# Patient Record
Sex: FEMALE | Race: WHITE | NOT HISPANIC OR LATINO | URBAN - METROPOLITAN AREA
[De-identification: names, ages, dates, MRNs, and addresses within clinical notes are randomized per-mention and may not be internally consistent; named-entity substitution may affect disease eponyms.]

---

## 2020-07-25 ENCOUNTER — TELEPHONE ENCOUNTER (OUTPATIENT)
Dept: URBAN - METROPOLITAN AREA CLINIC 13 | Facility: CLINIC | Age: 80
End: 2020-07-25

## 2020-07-25 RX ORDER — TRAZODONE HYDROCHLORIDE 50 MG/1
TAKE 1/2 TABLET AT BEDTIME TABLET ORAL
Refills: 0 | OUTPATIENT
End: 2019-12-05

## 2020-07-25 RX ORDER — RABEPRAZOLE SODIUM 20 MG/1
TAKE 1 TABLET DAILY TABLET, DELAYED RELEASE ORAL
Qty: 30 | Refills: 11 | OUTPATIENT
Start: 2019-12-09 | End: 2020-01-14

## 2020-07-25 RX ORDER — LEVOCETIRIZINE DIHYDROCHLORIDE 5 MG/1
TAKE 1 TABLET DAILY TABLET ORAL
Refills: 0 | OUTPATIENT
End: 2019-12-05

## 2020-07-25 RX ORDER — CELECOXIB 200 MG/1
TAKE 1 CAPSULE DAILY WITH A MEAL CAPSULE ORAL
Refills: 0 | OUTPATIENT
End: 2020-01-14

## 2020-07-25 RX ORDER — PREDNISONE 5 MG/1
2.5 FOR THREE WEEKS TABLET ORAL
Refills: 0 | OUTPATIENT
End: 2020-04-21

## 2020-07-25 RX ORDER — ESOMEPRAZOLE MAGNESIUM 40 MG
TAKE 1 CAPSULE ONCE DAILY CAPSULE,DELAYED RELEASE (ENTERIC COATED) ORAL
Refills: 0 | OUTPATIENT
End: 2019-12-05

## 2020-07-25 RX ORDER — POLYETHYLENE GLYCOL 3350, SODIUM CHLORIDE, SODIUM BICARBONATE AND POTASSIUM CHLORIDE WITH LEMON FLAVOR 420; 11.2; 5.72; 1.48 G/4L; G/4L; G/4L; G/4L
MIX CONTENTS PER PKG DIRECTIONS. DAY PRIOR TO PROCEDURE BEGIN DRINKING 1/2 SOULTION @ 5PM, COMPLETE  SECOND 1/2 6HR PRIOR TO PROCEDURE POWDER, FOR SOLUTION ORAL
Qty: 1 | Refills: 0 | OUTPATIENT
Start: 2020-01-17 | End: 2020-04-21

## 2020-07-25 RX ORDER — PANTOPRAZOLE SODIUM 40 MG/1
TAKE 1 TABLET 30 MINUTES BEFORE BREAKFAST TABLET, DELAYED RELEASE ORAL
Qty: 90 | Refills: 0 | OUTPATIENT
Start: 2019-12-10 | End: 2020-01-14

## 2020-07-25 RX ORDER — BIOTIN 2500 MCG
TAKE 1 CAPSULE PO DAILY. UNKNOWN DOSE PER PT CAPSULE ORAL
Refills: 0 | OUTPATIENT
End: 2019-12-05

## 2020-07-25 RX ORDER — ESOMEPRAZOLE MAGNESIUM 40 MG/1
TAKE 1 CAPSULE ONCE DAILY CAPSULE, DELAYED RELEASE PELLETS ORAL
Qty: 90 | Refills: 0 | OUTPATIENT
Start: 2019-12-05 | End: 2020-01-14

## 2020-07-26 ENCOUNTER — TELEPHONE ENCOUNTER (OUTPATIENT)
Dept: URBAN - METROPOLITAN AREA CLINIC 13 | Facility: CLINIC | Age: 80
End: 2020-07-26

## 2020-07-26 RX ORDER — QUETIAPINE FUMARATE 25 MG/1
TABLET, FILM COATED ORAL
Qty: 7 | Refills: 0 | Status: ACTIVE | COMMUNITY
Start: 2019-04-10

## 2020-07-26 RX ORDER — PREDNISONE 20 MG/1
TABLET ORAL
Qty: 6 | Refills: 0 | Status: ACTIVE | COMMUNITY
Start: 2019-09-06

## 2020-07-26 RX ORDER — DOXYCYCLINE 100 MG/1
TABLET, FILM COATED ORAL
Qty: 60 | Refills: 0 | Status: ACTIVE | COMMUNITY
Start: 2019-05-06

## 2020-07-26 RX ORDER — BETAMETHASONE DIPROPIONATE 0.5 MG/G
CREAM TOPICAL
Qty: 45 | Refills: 0 | Status: ACTIVE | COMMUNITY
Start: 2019-12-05

## 2020-07-26 RX ORDER — DICLOFENAC SODIUM 10 MG/G
GEL TOPICAL
Qty: 500 | Refills: 0 | Status: ACTIVE | COMMUNITY
Start: 2019-05-14

## 2020-07-26 RX ORDER — DOXYCYCLINE HYCLATE 100 MG/1
TABLET ORAL
Qty: 20 | Refills: 0 | Status: ACTIVE | COMMUNITY
Start: 2019-08-28

## 2020-07-26 RX ORDER — AZELASTINE HYDROCHLORIDE AND FLUTICASONE PROPIONATE 137; 50 UG/1; UG/1
SPRAY, METERED NASAL
Qty: 23 | Refills: 0 | Status: ACTIVE | COMMUNITY
Start: 2019-11-21

## 2020-07-26 RX ORDER — ASCORBIC ACID 500 MG
TAKE 1 TABLET DAILY TABLET ORAL
Refills: 0 | Status: ACTIVE | COMMUNITY

## 2020-07-26 RX ORDER — GABAPENTIN 100 MG/1
CAPSULE ORAL
Qty: 30 | Refills: 0 | Status: ACTIVE | COMMUNITY
Start: 2020-01-07

## 2020-07-26 RX ORDER — QUETIAPINE FUMARATE 25 MG/1
TABLET, FILM COATED ORAL
Qty: 90 | Refills: 0 | Status: ACTIVE | COMMUNITY
Start: 2019-04-18

## 2020-07-26 RX ORDER — CLOTRIMAZOLE 10 MG/G
CREAM TOPICAL
Qty: 45 | Refills: 0 | Status: ACTIVE | COMMUNITY
Start: 2019-11-21

## 2020-07-26 RX ORDER — TRIAMTERENE AND HYDROCHLOROTHIAZIDE 37.5; 25 MG/1; MG/1
TABLET ORAL
Qty: 90 | Refills: 0 | Status: ACTIVE | COMMUNITY
Start: 2020-04-06

## 2020-07-26 RX ORDER — ESZOPICLONE 2 MG
TAKE 1 TABLET AT BEDTIME AS NEEDED TABLET ORAL
Refills: 0 | Status: ACTIVE | COMMUNITY

## 2020-07-26 RX ORDER — HYDROCODONE BITARTRATE AND ACETAMINOPHEN 7.5; 325 MG/1; MG/1
TABLET ORAL
Qty: 30 | Refills: 0 | Status: ACTIVE | COMMUNITY
Start: 2019-01-29

## 2020-07-26 RX ORDER — PANTOPRAZOLE SODIUM 40 MG/1
TAKE 1 TABLET BY MOUTH TWICE DAILY TABLET, DELAYED RELEASE ORAL
Qty: 180 | Refills: 0 | Status: ACTIVE | COMMUNITY
Start: 2020-01-14

## 2020-07-26 RX ORDER — CETIRIZINE HYDROCHLORIDE 10 MG/1
TABLET, FILM COATED ORAL
Qty: 90 | Refills: 0 | Status: ACTIVE | COMMUNITY
Start: 2019-10-15

## 2020-07-26 RX ORDER — MELOXICAM 15 MG/1
TAKE 1 TABLET DAILY WITH FOOD TABLET ORAL
Refills: 0 | Status: ACTIVE | COMMUNITY
Start: 2019-11-22

## 2020-07-26 RX ORDER — VALACYCLOVIR 1 G/1
TABLET, FILM COATED ORAL
Qty: 90 | Refills: 0 | Status: ACTIVE | COMMUNITY
Start: 2019-11-22

## 2020-07-26 RX ORDER — CALCIUM CITRATE/VITAMIN D3 315MG-6.25
TAKE 1 TABLET DAILY UNKNOWN DOSAGE TABLET ORAL
Refills: 0 | Status: ACTIVE | COMMUNITY

## 2020-07-26 RX ORDER — MELOXICAM 15 MG/1
TABLET ORAL
Qty: 30 | Refills: 0 | Status: ACTIVE | COMMUNITY
Start: 2019-08-28

## 2020-07-26 RX ORDER — MONTELUKAST SODIUM 10 MG/1
TAKE 1 TABLET DAILY AS DIRECTED TABLET, FILM COATED ORAL
Refills: 0 | Status: ACTIVE | COMMUNITY

## 2020-07-26 RX ORDER — FEXOFENADINE HCL 60 MG/1
TABLET, FILM COATED ORAL
Qty: 180 | Refills: 0 | Status: ACTIVE | COMMUNITY
Start: 2019-12-05

## 2020-07-26 RX ORDER — PITAVASTATIN CALCIUM 2.09 MG/1
TABLET, FILM COATED ORAL
Qty: 90 | Refills: 0 | Status: ACTIVE | COMMUNITY
Start: 2019-08-28

## 2020-07-26 RX ORDER — TIZANIDINE 4 MG/1
TAKE 1 TABLET 3 TIMES DAILY AS NEEDED TABLET ORAL
Refills: 0 | Status: ACTIVE | COMMUNITY
Start: 2020-04-01

## 2020-07-26 RX ORDER — PREDNISONE 2.5 MG/1
TABLET ORAL
Qty: 18 | Refills: 0 | Status: ACTIVE | COMMUNITY
Start: 2020-01-07

## 2020-07-26 RX ORDER — UBIDECARENONE/VIT E ACET 100MG-5
TAKE 1 CAPSULE DAILY CAPSULE ORAL
Refills: 0 | Status: ACTIVE | COMMUNITY

## 2020-07-26 RX ORDER — METOCLOPRAMIDE HYDROCHLORIDE 5 MG/1
TAKE 1 TABLET 2 DAILY TABLET ORAL
Refills: 0 | Status: ACTIVE | COMMUNITY

## 2021-02-08 ENCOUNTER — ERX REFILL RESPONSE (OUTPATIENT)
Dept: URBAN - METROPOLITAN AREA CLINIC 113 | Facility: CLINIC | Age: 81
End: 2021-02-08

## 2021-02-08 ENCOUNTER — TELEPHONE ENCOUNTER (OUTPATIENT)
Dept: URBAN - METROPOLITAN AREA CLINIC 113 | Facility: CLINIC | Age: 81
End: 2021-02-08

## 2021-02-08 RX ORDER — PANTOPRAZOLE SODIUM 40 MG/1
TAKE 1 TABLET BY MOUTH TWICE DAILY TABLET, DELAYED RELEASE ORAL
Qty: 180 | Refills: 0

## 2021-02-08 RX ORDER — PANTOPRAZOLE SODIUM 40 MG/1
TAKE 1 TABLET BY MOUTH TWICE DAILY TABLET, DELAYED RELEASE ORAL
Qty: 60
Start: 2020-01-14

## 2021-03-02 ENCOUNTER — OFFICE VISIT (OUTPATIENT)
Dept: URBAN - METROPOLITAN AREA CLINIC 113 | Facility: CLINIC | Age: 81
End: 2021-03-02
Payer: MEDICARE

## 2021-03-02 VITALS
HEIGHT: 61 IN | BODY MASS INDEX: 23.22 KG/M2 | HEART RATE: 90 BPM | DIASTOLIC BLOOD PRESSURE: 80 MMHG | WEIGHT: 123 LBS | SYSTOLIC BLOOD PRESSURE: 140 MMHG | RESPIRATION RATE: 18 BRPM | TEMPERATURE: 97.1 F

## 2021-03-02 DIAGNOSIS — R13.12 OROPHARYNGEAL DYSPHAGIA: ICD-10-CM

## 2021-03-02 DIAGNOSIS — K59.09 CHRONIC CONSTIPATION: ICD-10-CM

## 2021-03-02 DIAGNOSIS — R14.3 EXCESSIVE GAS: ICD-10-CM

## 2021-03-02 DIAGNOSIS — K21.9 GASTROESOPHAGEAL REFLUX DISEASE, UNSPECIFIED WHETHER ESOPHAGITIS PRESENT: ICD-10-CM

## 2021-03-02 PROCEDURE — 99213 OFFICE O/P EST LOW 20 MIN: CPT | Performed by: NURSE PRACTITIONER

## 2021-03-02 RX ORDER — ASCORBIC ACID 500 MG
TAKE 1 TABLET DAILY TABLET ORAL
Refills: 0 | Status: ACTIVE | COMMUNITY

## 2021-03-02 RX ORDER — UBIDECARENONE/VIT E ACET 100MG-5
1 CAPSULE WITH A MEAL CAPSULE ORAL ONCE A DAY
Refills: 0 | Status: ACTIVE | COMMUNITY

## 2021-03-02 RX ORDER — CALCIUM CITRATE/VITAMIN D3 315MG-6.25
1 TABLET TABLET ORAL TWICE A DAY
Refills: 0 | Status: ACTIVE | COMMUNITY

## 2021-03-02 RX ORDER — DOXYCYCLINE HYCLATE 100 MG/1
TABLET ORAL
Qty: 20 | Refills: 0 | Status: DISCONTINUED | COMMUNITY
Start: 2019-08-28

## 2021-03-02 RX ORDER — AZELASTINE HYDROCHLORIDE AND FLUTICASONE PROPIONATE 137; 50 UG/1; UG/1
SPRAY, METERED NASAL
Qty: 23 | Refills: 0 | Status: DISCONTINUED | COMMUNITY
Start: 2019-11-21

## 2021-03-02 RX ORDER — TIZANIDINE 4 MG/1
TAKE 1 TABLET 3 TIMES DAILY AS NEEDED TABLET ORAL
Refills: 0 | Status: DISCONTINUED | COMMUNITY
Start: 2020-04-01

## 2021-03-02 RX ORDER — VALACYCLOVIR 1 G/1
TABLET, FILM COATED ORAL
Qty: 90 | Refills: 0 | Status: DISCONTINUED | COMMUNITY
Start: 2019-11-22

## 2021-03-02 RX ORDER — MONTELUKAST SODIUM 10 MG/1
TAKE 1 TABLET DAILY AS DIRECTED TABLET, FILM COATED ORAL
Refills: 0 | Status: ACTIVE | COMMUNITY

## 2021-03-02 RX ORDER — METOCLOPRAMIDE HYDROCHLORIDE 5 MG/1
TAKE 1 TABLET 2 DAILY TABLET ORAL
Refills: 0 | Status: DISCONTINUED | COMMUNITY

## 2021-03-02 RX ORDER — QUETIAPINE FUMARATE 25 MG/1
1 TABLET AT BEDTIME TABLET, FILM COATED ORAL ONCE A DAY
Refills: 0 | Status: ACTIVE | COMMUNITY
Start: 2019-04-10

## 2021-03-02 RX ORDER — BETAMETHASONE DIPROPIONATE 0.5 MG/G
CREAM TOPICAL
Qty: 45 | Refills: 0 | Status: DISCONTINUED | COMMUNITY
Start: 2019-12-05

## 2021-03-02 RX ORDER — GABAPENTIN 100 MG/1
CAPSULE ORAL
Qty: 30 | Refills: 0 | Status: DISCONTINUED | COMMUNITY
Start: 2020-01-07

## 2021-03-02 RX ORDER — FEXOFENADINE HCL 60 MG/1
TABLET, FILM COATED ORAL
Qty: 180 | Refills: 0 | Status: DISCONTINUED | COMMUNITY
Start: 2019-12-05

## 2021-03-02 RX ORDER — PANTOPRAZOLE SODIUM 40 MG/1
1 TABLET TABLET, DELAYED RELEASE ORAL ONCE A DAY
Refills: 0 | Status: ACTIVE | COMMUNITY

## 2021-03-02 RX ORDER — PREDNISONE 20 MG/1
TABLET ORAL
Qty: 6 | Refills: 0 | Status: DISCONTINUED | COMMUNITY
Start: 2019-09-06

## 2021-03-02 RX ORDER — PREDNISONE 2.5 MG/1
TABLET ORAL
Qty: 18 | Refills: 0 | Status: DISCONTINUED | COMMUNITY
Start: 2020-01-07

## 2021-03-02 RX ORDER — TRIAMTERENE AND HYDROCHLOROTHIAZIDE 37.5; 25 MG/1; MG/1
1 TABLET IN THE MORNING TABLET ORAL ONCE A DAY
Refills: 0 | Status: ACTIVE | COMMUNITY
Start: 2020-04-06

## 2021-03-02 RX ORDER — ESZOPICLONE 2 MG
TAKE 1 TABLET AT BEDTIME AS NEEDED TABLET ORAL
Refills: 0 | Status: DISCONTINUED | COMMUNITY

## 2021-03-02 RX ORDER — MELOXICAM 15 MG/1
TABLET ORAL
Qty: 30 | Refills: 0 | Status: DISCONTINUED | COMMUNITY
Start: 2019-08-28

## 2021-03-02 RX ORDER — CLOTRIMAZOLE 10 MG/G
CREAM TOPICAL
Qty: 45 | Refills: 0 | Status: DISCONTINUED | COMMUNITY
Start: 2019-11-21

## 2021-03-02 RX ORDER — HYDROCODONE BITARTRATE AND ACETAMINOPHEN 7.5; 325 MG/1; MG/1
TABLET ORAL
Qty: 30 | Refills: 0 | Status: DISCONTINUED | COMMUNITY
Start: 2019-01-29

## 2021-03-02 RX ORDER — PITAVASTATIN CALCIUM 2.09 MG/1
1 TABLET TABLET, FILM COATED ORAL ONCE A DAY
Refills: 0 | Status: ACTIVE | COMMUNITY
Start: 2019-08-28

## 2021-03-02 RX ORDER — DOXYCYCLINE 100 MG/1
TABLET, FILM COATED ORAL
Qty: 60 | Refills: 0 | Status: DISCONTINUED | COMMUNITY
Start: 2019-05-06

## 2021-03-02 RX ORDER — TEMAZEPAM 30 MG/1
1 CAPSULE AT BEDTIME AS NEEDED CAPSULE ORAL ONCE A DAY
Status: ACTIVE | COMMUNITY

## 2021-03-02 RX ORDER — CETIRIZINE HYDROCHLORIDE 10 MG/1
TABLET, FILM COATED ORAL
Qty: 90 | Refills: 0 | Status: DISCONTINUED | COMMUNITY
Start: 2019-10-15

## 2021-03-02 RX ORDER — DICLOFENAC SODIUM 10 MG/G
GEL TOPICAL
Qty: 500 | Refills: 0 | Status: DISCONTINUED | COMMUNITY
Start: 2019-05-14

## 2021-03-02 RX ORDER — PANTOPRAZOLE SODIUM 40 MG/1
1 TABLET TABLET, DELAYED RELEASE ORAL ONCE A DAY
Qty: 90 TABLET | Refills: 4

## 2021-03-02 NOTE — HPI-OTHER HISTORIES
Colonoscopy 1/21/2020: BBPS 9, sigmoid and descending diverticulosis, grade 1 nonbleeding internal hemorrhoids, removal of a 5 mm sessile sigmoid polyp. No path in record. EGD 1/21/2020:Normal esophagus, small hiatal hernia, multiple gastric polyps status post biopsy, normal examined duodenum.  Stomach body polyp was hyperplastic. EGD 1/31/17: Normal esophagus. Multiple gastric polyps with biopsies revealing fundic gland polyp without evidence of H. pylori organisms, intestinal metaplasia, dysplasia or malignancy. Chronic gastritis with biopsies revealing chemical gastropathy, no evidence of H. pylori, intestinal metaplasia, dysplasia or malignancy. Normal examined duodenum.

## 2021-03-02 NOTE — HPI-TODAY'S VISIT:
This is  an 80-year-old female with a history of arthritis, GERD, and chronic constipation presenting for follow-up.    She was last seen 4/21/2020 for follow-up regarding GERD and constipation.  She had previously experienced significant reflux symptoms that were controlled with pantoprazole.  Her only complaint was excessive belching.  She was instructed to begin a diet low in fermentable sugars and over-the-counter simethicone.  Constipation was controlled with Benefiber and MiraLAX alternating doses. She has been taking pantoprazole daily.  She reports significant symptom improvement since starting this medication.  She has infrequent episodes of regurgitation if she bends at the waist after eating lunch.  She has occasional, mild abdominal pain and infrequent nausea.  Lately, she has experienced difficulty swallowing solids and pills describing these lodging in her throat relieved with drinking water.  She also reports intermittent choking.  She denies esophageal symptoms of dysphagia.  She also admits severe allergies and constant postnasal drip which may contribute.    She is no longer taking Benefiber.  Her stools were too frequent when she took this along with MiraLAX.  She is taking MiraLAX daily for 2 to 3 days and then skips a day.  She is having regular bowel movements.  She continues to report pain significant amount of abdominal noise as well as belching and flatus.  She did not modify her diet.  She has cereal with milk intermittently, yogurt intermittently, and eats ice cream every night.

## 2022-04-09 ENCOUNTER — ERX REFILL RESPONSE (OUTPATIENT)
Dept: URBAN - METROPOLITAN AREA CLINIC 113 | Facility: CLINIC | Age: 82
End: 2022-04-09

## 2022-04-09 RX ORDER — PANTOPRAZOLE SODIUM 40 MG/1
TAKE 1 TABLET BY MOUTH EVERY DAY TABLET, DELAYED RELEASE ORAL
Qty: 90 TABLET | Refills: 1 | OUTPATIENT

## 2022-04-09 RX ORDER — PANTOPRAZOLE SODIUM 40 MG/1
TAKE 1 TABLET BY MOUTH EVERY DAY TABLET, DELAYED RELEASE ORAL
Qty: 90 TABLET | Refills: 0 | OUTPATIENT

## 2022-04-12 ENCOUNTER — TELEPHONE ENCOUNTER (OUTPATIENT)
Dept: URBAN - METROPOLITAN AREA CLINIC 113 | Facility: CLINIC | Age: 82
End: 2022-04-12

## 2022-04-12 RX ORDER — PANTOPRAZOLE SODIUM 40 MG/1
TAKE 1 TABLET BY MOUTH EVERY DAY TABLET, DELAYED RELEASE ORAL
Qty: 90 TABLET | Refills: 1

## 2022-05-25 ENCOUNTER — CLAIMS CREATED FROM THE CLAIM WINDOW (OUTPATIENT)
Dept: URBAN - METROPOLITAN AREA CLINIC 113 | Facility: CLINIC | Age: 82
End: 2022-05-25
Payer: MEDICARE

## 2022-05-25 VITALS
BODY MASS INDEX: 23.03 KG/M2 | HEART RATE: 70 BPM | HEIGHT: 61 IN | TEMPERATURE: 97.5 F | DIASTOLIC BLOOD PRESSURE: 78 MMHG | SYSTOLIC BLOOD PRESSURE: 177 MMHG | WEIGHT: 122 LBS

## 2022-05-25 DIAGNOSIS — R14.3 EXCESSIVE GAS: ICD-10-CM

## 2022-05-25 DIAGNOSIS — R13.12 OROPHARYNGEAL DYSPHAGIA: ICD-10-CM

## 2022-05-25 DIAGNOSIS — R10.13 EPIGASTRIC ABDOMINAL PAIN: ICD-10-CM

## 2022-05-25 DIAGNOSIS — K59.09 CHRONIC CONSTIPATION: ICD-10-CM

## 2022-05-25 DIAGNOSIS — K21.9 GASTROESOPHAGEAL REFLUX DISEASE, UNSPECIFIED WHETHER ESOPHAGITIS PRESENT: ICD-10-CM

## 2022-05-25 PROCEDURE — 99214 OFFICE O/P EST MOD 30 MIN: CPT | Performed by: NURSE PRACTITIONER

## 2022-05-25 RX ORDER — TRIAMTERENE AND HYDROCHLOROTHIAZIDE 37.5; 25 MG/1; MG/1
1 TABLET IN THE MORNING TABLET ORAL ONCE A DAY
Refills: 0 | Status: ACTIVE | COMMUNITY
Start: 2020-04-06

## 2022-05-25 RX ORDER — CALCIUM CITRATE/VITAMIN D3 315MG-6.25
1 TABLET TABLET ORAL TWICE A DAY
Refills: 0 | Status: ACTIVE | COMMUNITY

## 2022-05-25 RX ORDER — CELECOXIB 200 MG/1
1 CAPSULE WITH FOOD CAPSULE ORAL ONCE A DAY
Status: ACTIVE | COMMUNITY

## 2022-05-25 RX ORDER — PITAVASTATIN CALCIUM 2.09 MG/1
1 TABLET TABLET, FILM COATED ORAL ONCE A DAY
Refills: 0 | Status: ACTIVE | COMMUNITY
Start: 2019-08-28

## 2022-05-25 RX ORDER — UBIDECARENONE/VIT E ACET 100MG-5
1 CAPSULE WITH A MEAL CAPSULE ORAL ONCE A DAY
Refills: 0 | Status: ACTIVE | COMMUNITY

## 2022-05-25 RX ORDER — SUCRALFATE 1 G/1
1 TABLET; DISSOLVE IN 2 TSP WATER TABLET ORAL
Qty: 60 | Refills: 3 | OUTPATIENT

## 2022-05-25 RX ORDER — QUETIAPINE FUMARATE 25 MG/1
1 TABLET AT BEDTIME TABLET, FILM COATED ORAL ONCE A DAY
Refills: 0 | Status: ACTIVE | COMMUNITY
Start: 2019-04-10

## 2022-05-25 RX ORDER — PANTOPRAZOLE SODIUM 40 MG/1
TAKE 1 TABLET BY MOUTH EVERY DAY TABLET, DELAYED RELEASE ORAL
Qty: 90 TABLET | Refills: 1 | Status: ACTIVE | COMMUNITY

## 2022-05-25 RX ORDER — FLUTICASONE PROPIONATE 50 UG/1
1 SPRAY IN EACH NOSTRIL SPRAY, METERED NASAL ONCE A DAY
Status: ACTIVE | COMMUNITY

## 2022-05-25 RX ORDER — MONTELUKAST SODIUM 10 MG/1
TAKE 1 TABLET DAILY AS DIRECTED TABLET, FILM COATED ORAL
Refills: 0 | Status: ACTIVE | COMMUNITY

## 2022-05-25 RX ORDER — ASCORBIC ACID 500 MG
TAKE 1 TABLET DAILY TABLET ORAL
Refills: 0 | Status: ACTIVE | COMMUNITY

## 2022-05-25 RX ORDER — TEMAZEPAM 30 MG/1
1 CAPSULE AT BEDTIME AS NEEDED CAPSULE ORAL ONCE A DAY
Status: ACTIVE | COMMUNITY

## 2022-05-25 NOTE — HPI-TODAY'S VISIT:
This is an 82-year-old female with a history of arthritis, GERD, chronic constipation, excessive gas, and oropharyngeal dysphagia presenting for follow-up.  She was last seen 3/2/2021.  She was no longer taking Benefiber.  Her stools were too frequent when she took this along with MiraLAX.  She was taking MiraLAX daily for 2 to 3 days and then skipping a day.  She was having regular bowel movements.  She continues to report pain and a significant amount of abdominal noise as well as belching and flatus.  She had not modified her diet.  She was having cereal with milk and yogurt intermittently and eating ice cream every night.  GERD and epigastric pain were managed with pantoprazole 40 mg daily.  She was instructed to continue regular doses of MiraLAX.  It was discussed that excessive gas may be related to lactose intolerance.  A dairy holiday was recommended with subsequent use of Lactaid.  She was offered a modified barium swallow to assess oropharyngeal dysphagia but declined stating she would consider this if her symptoms worsen. She is taking pantoprazole 40 mg daily.  She has regurgitation if she eats and bends at the waist.  She denies heartburn.  She has occasional excessive belching.  Occasionally, pills were lodged in her throat and occasionally food will briefly lodged in this area relieved with drinking liquids.  She has pain indicated in the epigastrium twice a week.  This is postprandial and may be associated with diet.  She is unsure regarding the duration but it usually resolves without intervention.  She has nausea only if she skips meals.  She is taking MiraLAX most days.  She does not take it if she is leaving her home.  Her stools are unformed and "explosive".  Occasionally, she passes a small volume of stool with urination.  Overall, she states her current bowel pattern is improved over prior hard stools.  She admits intermittent bloating.  She continues to eat ice cream every night and yogurt every day.

## 2022-07-18 ENCOUNTER — TELEPHONE ENCOUNTER (OUTPATIENT)
Dept: URBAN - METROPOLITAN AREA CLINIC 113 | Facility: CLINIC | Age: 82
End: 2022-07-18

## 2022-07-18 RX ORDER — PANTOPRAZOLE SODIUM 40 MG/1
1 TABLET TABLET, DELAYED RELEASE ORAL ONCE A DAY
Qty: 90 | Refills: 3

## 2022-09-28 ENCOUNTER — WEB ENCOUNTER (OUTPATIENT)
Dept: URBAN - METROPOLITAN AREA CLINIC 113 | Facility: CLINIC | Age: 82
End: 2022-09-28

## 2022-09-28 ENCOUNTER — CLAIMS CREATED FROM THE CLAIM WINDOW (OUTPATIENT)
Dept: URBAN - METROPOLITAN AREA CLINIC 113 | Facility: CLINIC | Age: 82
End: 2022-09-28
Payer: MEDICARE

## 2022-09-28 VITALS
RESPIRATION RATE: 20 BRPM | WEIGHT: 128 LBS | TEMPERATURE: 97.5 F | HEART RATE: 77 BPM | BODY MASS INDEX: 24.17 KG/M2 | HEIGHT: 61 IN | DIASTOLIC BLOOD PRESSURE: 75 MMHG | SYSTOLIC BLOOD PRESSURE: 139 MMHG

## 2022-09-28 DIAGNOSIS — K21.9 GASTROESOPHAGEAL REFLUX DISEASE, UNSPECIFIED WHETHER ESOPHAGITIS PRESENT: ICD-10-CM

## 2022-09-28 DIAGNOSIS — R14.3 EXCESSIVE GAS: ICD-10-CM

## 2022-09-28 DIAGNOSIS — R13.12 OROPHARYNGEAL DYSPHAGIA: ICD-10-CM

## 2022-09-28 DIAGNOSIS — R10.13 EPIGASTRIC ABDOMINAL PAIN: ICD-10-CM

## 2022-09-28 DIAGNOSIS — K59.09 CHRONIC CONSTIPATION: ICD-10-CM

## 2022-09-28 DIAGNOSIS — R11.0 NAUSEA: ICD-10-CM

## 2022-09-28 PROCEDURE — 99213 OFFICE O/P EST LOW 20 MIN: CPT | Performed by: NURSE PRACTITIONER

## 2022-09-28 RX ORDER — ASCORBIC ACID 500 MG
TAKE 1 TABLET DAILY TABLET ORAL
Refills: 0 | Status: ACTIVE | COMMUNITY

## 2022-09-28 RX ORDER — SUCRALFATE 1 G/1
1 TABLET; DISSOLVE IN 2 TSP WATER TABLET ORAL
Qty: 60 | Refills: 3 | Status: ACTIVE | COMMUNITY

## 2022-09-28 RX ORDER — PITAVASTATIN CALCIUM 2.09 MG/1
1 TABLET TABLET, FILM COATED ORAL ONCE A DAY
Refills: 0 | Status: ON HOLD | COMMUNITY
Start: 2019-08-28

## 2022-09-28 RX ORDER — QUETIAPINE FUMARATE 25 MG/1
1 TABLET AT BEDTIME TABLET, FILM COATED ORAL ONCE A DAY
Refills: 0 | Status: ACTIVE | COMMUNITY
Start: 2019-04-10

## 2022-09-28 RX ORDER — FLUTICASONE PROPIONATE 50 UG/1
1 SPRAY IN EACH NOSTRIL SPRAY, METERED NASAL ONCE A DAY
Status: ACTIVE | COMMUNITY

## 2022-09-28 RX ORDER — TRIAMTERENE AND HYDROCHLOROTHIAZIDE 37.5; 25 MG/1; MG/1
1 TABLET IN THE MORNING TABLET ORAL ONCE A DAY
Refills: 0 | Status: ACTIVE | COMMUNITY
Start: 2020-04-06

## 2022-09-28 RX ORDER — TEMAZEPAM 30 MG/1
1 CAPSULE AT BEDTIME AS NEEDED CAPSULE ORAL ONCE A DAY
Status: ACTIVE | COMMUNITY

## 2022-09-28 RX ORDER — ASPIRIN 81 MG/1
1 TABLET TABLET, COATED ORAL ONCE A DAY
Status: ACTIVE | COMMUNITY

## 2022-09-28 RX ORDER — CALCIUM CITRATE/VITAMIN D3 315MG-6.25
1 TABLET TABLET ORAL TWICE A DAY
Refills: 0 | Status: ACTIVE | COMMUNITY

## 2022-09-28 RX ORDER — CELECOXIB 200 MG/1
1 CAPSULE WITH FOOD CAPSULE ORAL ONCE A DAY
Status: ACTIVE | COMMUNITY

## 2022-09-28 RX ORDER — UBIDECARENONE/VIT E ACET 100MG-5
1 CAPSULE WITH A MEAL CAPSULE ORAL ONCE A DAY
Refills: 0 | Status: ACTIVE | COMMUNITY

## 2022-09-28 RX ORDER — MONTELUKAST SODIUM 10 MG/1
TAKE 1 TABLET DAILY AS DIRECTED TABLET, FILM COATED ORAL
Refills: 0 | Status: ACTIVE | COMMUNITY

## 2022-09-28 RX ORDER — ATORVASTATIN CALCIUM 40 MG/1
1 TABLET TABLET, FILM COATED ORAL ONCE A DAY
Status: ACTIVE | COMMUNITY

## 2022-09-28 RX ORDER — METOPROLOL SUCCINATE 25 MG/1
1/2 TABLET TABLET, FILM COATED, EXTENDED RELEASE ORAL ONCE A DAY
Status: ACTIVE | COMMUNITY

## 2022-09-28 RX ORDER — PANTOPRAZOLE SODIUM 40 MG/1
1 TABLET TABLET, DELAYED RELEASE ORAL ONCE A DAY
Qty: 90 | Refills: 3 | Status: ACTIVE | COMMUNITY

## 2022-09-28 NOTE — HPI-TODAY'S VISIT:
This is an 82-year-old female with a history of arthritis, GERD, chronic constipation, excessive gas, epigastric pain, and oropharyngeal dysphagia presenting for follow-up.  She was last last seen 5/25/2022.  GERD was essentially controlled with pantoprazole 40 mg daily.  She was experiencing postprandial epigastric pain twice a week that had previously responded to pantoprazole suggesting an acid peptic disorder.  Sucralfate was added.  She was having regular bowel movements on MiraLAX but reported unformed stools.  She had not tolerated fiber in the past.  She was to decrease MiraLAX to one half capful daily and titrate to effect.  She was complaining of excessive gas and bloating.  It was determined this may be associated with lactose intolerance.  She was unwilling to alter her diet.  She was to try Lactaid prior to ingesting dairy.  She was offered a barium swallow to evaluate oropharyngeal dysphagia but declined.  Conservative follow-up was planned per patient request. She is taking pantoprazole 40 mg daily.  She denies acid reflux.  Abdominal pain is rare.  She continues to report a sensation that food lodges low in her throat occasionally causing her to cough and choke.  This is increasing in frequency.  She denies esophageal symptoms of dysphagia.  Occasionally, she has nausea further described as "my stomach flip-flops" relieved with eating less and taking sucralfate.  She is taking MiraLAX every day.  She is having daily bowel movements.  She denies red blood per rectum or melena.  She reports noisy gas with her bowel movements and frequent belching which is bothersome.  She tried taking Lactaid and reports no improvement.  She continues to ingest dairy products.  Of all of her symptoms, excessive gas is the most bothersome at this point.  She had EKG changes for which she had a stress test and MRI or CT.  A 50 to 60% blockage was demonstrated on imaging.  She had a cardiac catheterization on 8/30/2022.  She reports her arteries are narrow and contain plaque.  Dr. Lee felt as though placing a stent was risky and has opted to treat her medically.  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  Her daughter was recently diagnosed with stage IV lung cancer and is currently on immunotherapy and has radiation treatments planned.

## 2022-09-30 PROBLEM — 71457002: Status: ACTIVE | Noted: 2021-03-02

## 2022-09-30 PROBLEM — 79922009: Status: ACTIVE | Noted: 2022-09-30

## 2022-09-30 PROBLEM — 235595009: Status: ACTIVE | Noted: 2021-03-02

## 2022-09-30 PROBLEM — 422587007: Status: ACTIVE | Noted: 2022-09-30

## 2022-09-30 PROBLEM — 80301007: Status: ACTIVE | Noted: 2021-03-02

## 2022-09-30 PROBLEM — 236069009: Status: ACTIVE | Noted: 2021-03-02

## 2023-03-29 ENCOUNTER — OFFICE VISIT (OUTPATIENT)
Dept: URBAN - METROPOLITAN AREA CLINIC 113 | Facility: CLINIC | Age: 83
End: 2023-03-29
Payer: MEDICARE

## 2023-03-29 ENCOUNTER — LAB OUTSIDE AN ENCOUNTER (OUTPATIENT)
Dept: URBAN - METROPOLITAN AREA CLINIC 113 | Facility: CLINIC | Age: 83
End: 2023-03-29

## 2023-03-29 VITALS
SYSTOLIC BLOOD PRESSURE: 128 MMHG | RESPIRATION RATE: 20 BRPM | BODY MASS INDEX: 22.28 KG/M2 | TEMPERATURE: 97.6 F | HEART RATE: 71 BPM | HEIGHT: 61 IN | WEIGHT: 118 LBS | DIASTOLIC BLOOD PRESSURE: 62 MMHG

## 2023-03-29 DIAGNOSIS — K21.9 GASTROESOPHAGEAL REFLUX DISEASE, UNSPECIFIED WHETHER ESOPHAGITIS PRESENT: ICD-10-CM

## 2023-03-29 DIAGNOSIS — R68.81 EARLY SATIETY: ICD-10-CM

## 2023-03-29 DIAGNOSIS — R13.19 ESOPHAGEAL DYSPHAGIA: ICD-10-CM

## 2023-03-29 DIAGNOSIS — K59.09 CHRONIC CONSTIPATION: ICD-10-CM

## 2023-03-29 PROBLEM — 442076002: Status: ACTIVE | Noted: 2023-03-29

## 2023-03-29 PROBLEM — 40890009: Status: ACTIVE | Noted: 2023-03-29

## 2023-03-29 PROCEDURE — 99214 OFFICE O/P EST MOD 30 MIN: CPT | Performed by: NURSE PRACTITIONER

## 2023-03-29 RX ORDER — FLUTICASONE PROPIONATE 50 UG/1
1 SPRAY IN EACH NOSTRIL SPRAY, METERED NASAL ONCE A DAY
Status: DISCONTINUED | COMMUNITY

## 2023-03-29 RX ORDER — DICYCLOMINE HYDROCHLORIDE 10 MG/1
1 TABLET CAPSULE ORAL
Status: ACTIVE | COMMUNITY

## 2023-03-29 RX ORDER — PANTOPRAZOLE SODIUM 40 MG/1
1 TABLET TABLET, DELAYED RELEASE ORAL ONCE A DAY
Qty: 90 | Refills: 3 | Status: ACTIVE | COMMUNITY

## 2023-03-29 RX ORDER — CALCIUM CITRATE/VITAMIN D3 315MG-6.25
1 TABLET TABLET ORAL TWICE A DAY
Refills: 0 | Status: ACTIVE | COMMUNITY

## 2023-03-29 RX ORDER — TEMAZEPAM 30 MG/1
1 CAPSULE AT BEDTIME AS NEEDED CAPSULE ORAL ONCE A DAY
Status: ACTIVE | COMMUNITY

## 2023-03-29 RX ORDER — METOPROLOL SUCCINATE 25 MG/1
1/2 TABLET TABLET, FILM COATED, EXTENDED RELEASE ORAL ONCE A DAY
Status: DISCONTINUED | COMMUNITY

## 2023-03-29 RX ORDER — ATORVASTATIN CALCIUM 20 MG/1
1/2 TABLET TABLET, FILM COATED ORAL ONCE A DAY
Status: ACTIVE | COMMUNITY

## 2023-03-29 RX ORDER — TRIAMTERENE AND HYDROCHLOROTHIAZIDE 37.5; 25 MG/1; MG/1
1 TABLET IN THE MORNING TABLET ORAL ONCE A DAY
Refills: 0 | Status: DISCONTINUED | COMMUNITY
Start: 2020-04-06

## 2023-03-29 RX ORDER — CELECOXIB 200 MG/1
1 CAPSULE WITH FOOD CAPSULE ORAL ONCE A DAY
Status: DISCONTINUED | COMMUNITY

## 2023-03-29 RX ORDER — QUETIAPINE FUMARATE 25 MG/1
1 TABLET AT BEDTIME TABLET, FILM COATED ORAL ONCE A DAY
Refills: 0 | Status: DISCONTINUED | COMMUNITY
Start: 2019-04-10

## 2023-03-29 RX ORDER — CHOLECALCIFEROL (VITAMIN D3) 125 MCG
AS DIRECTED TABLET ORAL
Status: ACTIVE | COMMUNITY

## 2023-03-29 RX ORDER — ASPIRIN 81 MG/1
1 TABLET TABLET, COATED ORAL ONCE A DAY
Status: ACTIVE | COMMUNITY

## 2023-03-29 RX ORDER — GABAPENTIN 100 MG/1
1 CAPSULE CAPSULE ORAL ONCE A DAY
Status: ACTIVE | COMMUNITY

## 2023-03-29 RX ORDER — ASCORBIC ACID 500 MG
TAKE 1 TABLET DAILY TABLET ORAL
Refills: 0 | Status: ACTIVE | COMMUNITY

## 2023-03-29 RX ORDER — MONTELUKAST SODIUM 10 MG/1
TAKE 1 TABLET DAILY AS DIRECTED TABLET, FILM COATED ORAL
Refills: 0 | Status: ACTIVE | COMMUNITY

## 2023-03-29 RX ORDER — SUCRALFATE 1 G/1
1 TABLET; DISSOLVE IN 2 TSP WATER TABLET ORAL
Qty: 60 | Refills: 3 | Status: ACTIVE | COMMUNITY

## 2023-03-29 RX ORDER — UBIDECARENONE/VIT E ACET 100MG-5
1 CAPSULE WITH A MEAL CAPSULE ORAL ONCE A DAY
Refills: 0 | Status: ACTIVE | COMMUNITY

## 2023-03-29 RX ORDER — PITAVASTATIN CALCIUM 2.09 MG/1
1 TABLET TABLET, FILM COATED ORAL ONCE A DAY
Refills: 0 | Status: ON HOLD | COMMUNITY
Start: 2019-08-28

## 2023-03-29 NOTE — HPI-TODAY'S VISIT:
This is an 82-year-old female with a history of arthritis, GERD, chronic constipation, excessive gas, epigastric pain, nausea, and oropharyngeal dysphagia presenting for follow-up. She was last seen 9/28/2022.  Acid reflux was well controlled on pantoprazole 40 mg daily.  Episodes of epigastric pain were rare since she had started PPI.  She has occasional mild nausea managed with dietary modification and sucralfate.  Chronic constipation is controlled with MiraLAX 1 capful daily.  She describes oropharyngeal dysphagia occurring with increasing frequency.  A modified barium swallow was once again offered.  She declined in part to her schedule and her daughters help.  It was recommended that she observe careful eating practices and positioning her head in a neutral, slightly chin down position when swallowing.  She was to call if symptoms worsen to schedule a barium swallow.  She reported excessive gas in the form of belching and flatus.  It was discussed that lactose intolerance was likely contributing.  She was unwilling to give up dairy products and Lactaid was ineffective.  Information was provided that included lifestyle modification to reduce air swallowing and decrease gas producing food.  FD guard or a simethicone product were considerations. She is taking pantoprazole 40 mg every morning.  She intermittently has excessive belching.  Symptoms of difficulty swallowing have worsened since she underwent C-spine surgery in early December 2022.  She reports medication will lodged the base of her throat or in her upper esophagus.  This occasionally occurs when she is eating.  She has to cut her food into very small pieces.  Drinking liquids is eventually helpful.  She states she lost some weight shortly after surgery because her throat was swollen and she was choking easily.  If she postpones eating, she has pain indicated in the epigastrium described as "grinding or gnawing" that resulted in nausea.  This is relieved with taking sucralfate.  She has occasional lower abdominal pain if she is constipated.   she infrequently takes dicyclomine but reports it is effective.  She is taking MiraLAX 1 capful daily.  She has a bowel movement most days.  She describes her stools as "small squirts" with a large bowel movement once or twice a week on average.  She continues to report that she does not have the urge to defecate.  She has persistent gas and bloating.  She tried Beano and it was ineffective. She denies red blood per rectum or melena. She reports dramatically improved neck pain since C-spine surgery.  She is requiring gabapentin less frequently.

## 2023-03-30 ENCOUNTER — TELEPHONE ENCOUNTER (OUTPATIENT)
Dept: URBAN - METROPOLITAN AREA CLINIC 113 | Facility: CLINIC | Age: 83
End: 2023-03-30

## 2023-06-15 ENCOUNTER — CLAIMS CREATED FROM THE CLAIM WINDOW (OUTPATIENT)
Dept: URBAN - METROPOLITAN AREA SURGERY CENTER 25 | Facility: SURGERY CENTER | Age: 83
End: 2023-06-15
Payer: MEDICARE

## 2023-06-15 ENCOUNTER — CLAIMS CREATED FROM THE CLAIM WINDOW (OUTPATIENT)
Dept: URBAN - METROPOLITAN AREA CLINIC 4 | Facility: CLINIC | Age: 83
End: 2023-06-15
Payer: MEDICARE

## 2023-06-15 ENCOUNTER — CLAIMS CREATED FROM THE CLAIM WINDOW (OUTPATIENT)
Dept: URBAN - METROPOLITAN AREA SURGERY CENTER 25 | Facility: SURGERY CENTER | Age: 83
End: 2023-06-15

## 2023-06-15 DIAGNOSIS — K31.7 BENIGN GASTRIC POLYP: ICD-10-CM

## 2023-06-15 DIAGNOSIS — R13.19 CERVICAL DYSPHAGIA: ICD-10-CM

## 2023-06-15 DIAGNOSIS — K22.2 ACQUIRED ESOPHAGEAL RING: ICD-10-CM

## 2023-06-15 DIAGNOSIS — K31.89 OTHER DISEASES OF STOMACH AND DUODENUM: ICD-10-CM

## 2023-06-15 DIAGNOSIS — R63.4 ABNORMAL INTENTIONAL WEIGHT LOSS: ICD-10-CM

## 2023-06-15 DIAGNOSIS — K31.89 ACQUIRED DEFORMITY OF DUODENUM: ICD-10-CM

## 2023-06-15 DIAGNOSIS — K22.2 ESOPHAGEAL STENOSIS: ICD-10-CM

## 2023-06-15 PROCEDURE — 43248 EGD GUIDE WIRE INSERTION: CPT | Performed by: INTERNAL MEDICINE

## 2023-06-15 PROCEDURE — 43239 EGD BIOPSY SINGLE/MULTIPLE: CPT | Performed by: INTERNAL MEDICINE

## 2023-06-15 PROCEDURE — 00731 ANES UPR GI NDSC PX NOS: CPT | Performed by: ANESTHESIOLOGY

## 2023-06-15 PROCEDURE — 88312 SPECIAL STAINS GROUP 1: CPT | Performed by: PATHOLOGY

## 2023-06-15 PROCEDURE — 00731 ANES UPR GI NDSC PX NOS: CPT | Performed by: ANESTHESIOLOGIST ASSISTANT

## 2023-06-15 PROCEDURE — 88305 TISSUE EXAM BY PATHOLOGIST: CPT | Performed by: PATHOLOGY

## 2023-06-15 PROCEDURE — 88342 IMHCHEM/IMCYTCHM 1ST ANTB: CPT | Performed by: PATHOLOGY

## 2023-06-15 PROCEDURE — 99100 ANES PT EXTEME AGE<1 YR&>70: CPT | Performed by: ANESTHESIOLOGIST ASSISTANT

## 2023-06-15 PROCEDURE — G8907 PT DOC NO EVENTS ON DISCHARG: HCPCS | Performed by: INTERNAL MEDICINE

## 2023-06-15 PROCEDURE — 99100 ANES PT EXTEME AGE<1 YR&>70: CPT | Performed by: ANESTHESIOLOGY

## 2023-06-15 RX ORDER — PANTOPRAZOLE SODIUM 40 MG/1
1 TABLET TABLET, DELAYED RELEASE ORAL ONCE A DAY
Qty: 90 | Refills: 3 | Status: ACTIVE | COMMUNITY

## 2023-06-15 RX ORDER — MONTELUKAST SODIUM 10 MG/1
TAKE 1 TABLET DAILY AS DIRECTED TABLET, FILM COATED ORAL
Refills: 0 | Status: ACTIVE | COMMUNITY

## 2023-06-15 RX ORDER — CHOLECALCIFEROL (VITAMIN D3) 125 MCG
AS DIRECTED TABLET ORAL
Status: ACTIVE | COMMUNITY

## 2023-06-15 RX ORDER — GABAPENTIN 100 MG/1
1 CAPSULE CAPSULE ORAL ONCE A DAY
Status: ACTIVE | COMMUNITY

## 2023-06-15 RX ORDER — CALCIUM CITRATE/VITAMIN D3 315MG-6.25
1 TABLET TABLET ORAL TWICE A DAY
Refills: 0 | Status: ACTIVE | COMMUNITY

## 2023-06-15 RX ORDER — ASCORBIC ACID 500 MG
TAKE 1 TABLET DAILY TABLET ORAL
Refills: 0 | Status: ACTIVE | COMMUNITY

## 2023-06-15 RX ORDER — UBIDECARENONE/VIT E ACET 100MG-5
1 CAPSULE WITH A MEAL CAPSULE ORAL ONCE A DAY
Refills: 0 | Status: ACTIVE | COMMUNITY

## 2023-06-15 RX ORDER — ATORVASTATIN CALCIUM 20 MG/1
1/2 TABLET TABLET, FILM COATED ORAL ONCE A DAY
Status: ACTIVE | COMMUNITY

## 2023-06-15 RX ORDER — ASPIRIN 81 MG/1
1 TABLET TABLET, COATED ORAL ONCE A DAY
Status: ACTIVE | COMMUNITY

## 2023-06-15 RX ORDER — SUCRALFATE 1 G/1
1 TABLET; DISSOLVE IN 2 TSP WATER TABLET ORAL
Qty: 60 | Refills: 3 | Status: ACTIVE | COMMUNITY

## 2023-06-15 RX ORDER — DICYCLOMINE HYDROCHLORIDE 10 MG/1
1 TABLET CAPSULE ORAL
Status: ACTIVE | COMMUNITY

## 2023-06-15 RX ORDER — PITAVASTATIN CALCIUM 2.09 MG/1
1 TABLET TABLET, FILM COATED ORAL ONCE A DAY
Refills: 0 | Status: ON HOLD | COMMUNITY
Start: 2019-08-28

## 2023-06-15 RX ORDER — TEMAZEPAM 30 MG/1
1 CAPSULE AT BEDTIME AS NEEDED CAPSULE ORAL ONCE A DAY
Status: ACTIVE | COMMUNITY

## 2023-07-20 ENCOUNTER — LAB OUTSIDE AN ENCOUNTER (OUTPATIENT)
Dept: URBAN - METROPOLITAN AREA CLINIC 113 | Facility: CLINIC | Age: 83
End: 2023-07-20

## 2023-07-20 ENCOUNTER — WEB ENCOUNTER (OUTPATIENT)
Dept: URBAN - METROPOLITAN AREA CLINIC 113 | Facility: CLINIC | Age: 83
End: 2023-07-20

## 2023-07-20 ENCOUNTER — OFFICE VISIT (OUTPATIENT)
Dept: URBAN - METROPOLITAN AREA CLINIC 113 | Facility: CLINIC | Age: 83
End: 2023-07-20
Payer: MEDICARE

## 2023-07-20 VITALS
DIASTOLIC BLOOD PRESSURE: 67 MMHG | TEMPERATURE: 97.5 F | RESPIRATION RATE: 20 BRPM | WEIGHT: 112 LBS | HEART RATE: 74 BPM | HEIGHT: 61 IN | SYSTOLIC BLOOD PRESSURE: 155 MMHG | BODY MASS INDEX: 21.14 KG/M2

## 2023-07-20 DIAGNOSIS — R63.4 WEIGHT LOSS: ICD-10-CM

## 2023-07-20 DIAGNOSIS — R68.81 EARLY SATIETY: ICD-10-CM

## 2023-07-20 DIAGNOSIS — K59.09 CHRONIC CONSTIPATION: ICD-10-CM

## 2023-07-20 DIAGNOSIS — K21.9 GASTROESOPHAGEAL REFLUX DISEASE, UNSPECIFIED WHETHER ESOPHAGITIS PRESENT: ICD-10-CM

## 2023-07-20 DIAGNOSIS — R10.13 EPIGASTRIC ABDOMINAL PAIN: ICD-10-CM

## 2023-07-20 DIAGNOSIS — R11.0 NAUSEA: ICD-10-CM

## 2023-07-20 DIAGNOSIS — R13.12 OROPHARYNGEAL DYSPHAGIA: ICD-10-CM

## 2023-07-20 DIAGNOSIS — R13.19 ESOPHAGEAL DYSPHAGIA: ICD-10-CM

## 2023-07-20 PROCEDURE — 99214 OFFICE O/P EST MOD 30 MIN: CPT | Performed by: INTERNAL MEDICINE

## 2023-07-20 RX ORDER — MONTELUKAST SODIUM 10 MG/1
TAKE 1 TABLET DAILY AS DIRECTED TABLET, FILM COATED ORAL
Refills: 0 | Status: ACTIVE | COMMUNITY

## 2023-07-20 RX ORDER — ATORVASTATIN CALCIUM 40 MG/1
1/2 TABLET TABLET, FILM COATED ORAL ONCE A DAY
Status: ACTIVE | COMMUNITY

## 2023-07-20 RX ORDER — SUCRALFATE 1 G/1
1 TABLET; DISSOLVE IN 2 TSP WATER TABLET ORAL
Qty: 60 | Refills: 3 | Status: ACTIVE | COMMUNITY

## 2023-07-20 RX ORDER — TEMAZEPAM 30 MG/1
1 CAPSULE AT BEDTIME AS NEEDED CAPSULE ORAL ONCE A DAY
Status: ACTIVE | COMMUNITY

## 2023-07-20 RX ORDER — GABAPENTIN 100 MG/1
1 CAPSULE CAPSULE ORAL ONCE A DAY
Status: ACTIVE | COMMUNITY

## 2023-07-20 RX ORDER — VIT A/VIT C/VIT E/ZINC/COPPER 2148-113
AS DIRECTED TABLET ORAL
Status: ACTIVE | COMMUNITY

## 2023-07-20 RX ORDER — ASCORBIC ACID 500 MG
TAKE 1 TABLET DAILY TABLET ORAL
Refills: 0 | Status: ACTIVE | COMMUNITY

## 2023-07-20 RX ORDER — PANTOPRAZOLE SODIUM 40 MG/1
1 TABLET TABLET, DELAYED RELEASE ORAL ONCE A DAY
Qty: 90 | Refills: 3 | Status: ACTIVE | COMMUNITY

## 2023-07-20 RX ORDER — DICYCLOMINE HYDROCHLORIDE 10 MG/1
1 TABLET CAPSULE ORAL
Status: ACTIVE | COMMUNITY

## 2023-07-20 RX ORDER — CALCIUM CITRATE/VITAMIN D3 315MG-6.25
1 TABLET TABLET ORAL TWICE A DAY
Refills: 0 | Status: ACTIVE | COMMUNITY

## 2023-07-20 RX ORDER — PITAVASTATIN CALCIUM 2.09 MG/1
1 TABLET TABLET, FILM COATED ORAL ONCE A DAY
Refills: 0 | Status: ON HOLD | COMMUNITY
Start: 2019-08-28

## 2023-07-20 RX ORDER — ASPIRIN 81 MG/1
1 TABLET TABLET, COATED ORAL ONCE A DAY
Status: ACTIVE | COMMUNITY

## 2023-07-20 RX ORDER — CHOLECALCIFEROL (VITAMIN D3) 125 MCG
AS DIRECTED TABLET ORAL
Status: ACTIVE | COMMUNITY

## 2023-07-20 RX ORDER — UBIDECARENONE/VIT E ACET 100MG-5
1 CAPSULE WITH A MEAL CAPSULE ORAL ONCE A DAY
Refills: 0 | Status: ACTIVE | COMMUNITY

## 2023-07-20 NOTE — HPI-TODAY'S VISIT:
This is an 83-year-old female with a history of arthritis, GERD, chronic constipation, excessive gas, epigastric pain, nausea, and oropharyngeal dysphagia presenting for follow-up. She was last seen on 3/29/23 by Francisca Smith.   She was previously seen 9/28/2022.  Acid reflux was well controlled on pantoprazole 40 mg daily.  Episodes of epigastric pain were rare since she had started PPI.  She has occasional mild nausea managed with dietary modification and sucralfate.  Chronic constipation is controlled with MiraLAX 1 capful daily.  She describes oropharyngeal dysphagia occurring with increasing frequency.  A modified barium swallow was once again offered.  She declined in part to her schedule and her daughters help.  It was recommended that she observe careful eating practices and positioning her head in a neutral, slightly chin down position when swallowing.  She was to call if symptoms worsen to schedule a barium swallow.  She reported excessive gas in the form of belching and flatus.  It was discussed that lactose intolerance was likely contributing.  She was unwilling to give up dairy products and Lactaid was ineffective.  Information was provided that included lifestyle modification to reduce air swallowing and decrease gas producing food.  FD guard or a simethicone product were considerations.  She was taking pantoprazole 40 mg every morning and intermittently had excessive belching.  Symptoms of difficulty swallowing worsened after she underwent C-spine surgery in early December 2022.  She reports medication lodged the base of her throat or in her upper esophagus.  This occasionally occurs when she is eating.  She has to cut her food into very small pieces.  Drinking liquids as eventually helpful.  She states she lost some weight shortly after surgery because her throat was swollen and she was choking easily.  If she postpones eating, she had epigastric pain described as "grinding or gnawing" with associated nausea, typically  relieved by taking sucralfate.  She had occasional lower abdominal pain if she was constipated.   she infrequently takes dicyclomine but reports it is effective.  She is taking MiraLAX 1 capful daily and was having a bowel movement most days.  She describesdher stools as "small squirts" with a large bowel movement once or twice a week on average.  She continued to report that she does not have the urge to defecate.  She had persistent gas and bloating.  She tried Beano and it was ineffective. She denied red blood per rectum or melena.  She reported dramatically improved neck pain since C-spine surgery.  She is requiring gabapentin less frequently.  After her visit with Francisca on March 29, 2023, the patient was placed on pantoprazole 40 mg daily for reflux complaints and scheduled for upper GI endoscopy.  This was delayed for some time given the need for clearance by her neurosurgeon.  The patient was continued on Benefiber and MiraLAX for constipation (Benefiber 2 tablespoons/day and MiraLAX 1 capful per day).  The patient's upper endoscopy was ultimately done on Linda 15, 2023.  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  The duodenum was normal.  The patient states that she had improvement in her swallowing after dilation but she is still having some difficulty with swallowing solids.  Her co-Q10 pill, for example, sticks consistently.  She has had some mild episodic crampy abdominal pain.,  Primary localized to the epigastrium.  She had some severe headaches, which have been attributed to musculoskeletal pain and/or stress.  Of note, the patient is lost a significant amount of weight recently, as documented below.  The patient is also had significant musculoskeletal cramping with movement.  She wonders if this may be related to her statin drugs.

## 2023-07-21 LAB
A/G RATIO: 1.9
ABSOLUTE BASOPHILS: 112
ABSOLUTE EOSINOPHILS: 360
ABSOLUTE LYMPHOCYTES: 1424
ABSOLUTE MONOCYTES: 1048
ABSOLUTE NEUTROPHILS: 5056
ALBUMIN: 3.8
ALKALINE PHOSPHATASE: 54
ALT (SGPT): 26
AST (SGOT): 34
BASOPHILS: 1.4
BILIRUBIN, TOTAL: 0.5
BUN/CREATININE RATIO: 16
BUN: 16
CALCIUM: 9.1
CARBON DIOXIDE, TOTAL: 26
CHLORIDE: 102
CREATININE: 1.03
EGFR: 54
EOSINOPHILS: 4.5
GLOBULIN, TOTAL: 2
GLUCOSE: 81
HEMATOCRIT: 39
HEMOGLOBIN: 13.8
LIPASE: 47
LYMPHOCYTES: 17.8
MCH: 31.2
MCHC: 35.4
MCV: 88.2
MONOCYTES: 13.1
MPV: 9.1
NEUTROPHILS: 63.2
PLATELET COUNT: 196
POTASSIUM: 4
PROTEIN, TOTAL: 5.8
RDW: 21.4
RED BLOOD CELL COUNT: 4.42
SODIUM: 138
TSH: 1.8
WHITE BLOOD CELL COUNT: 8

## 2023-08-23 ENCOUNTER — ERX REFILL RESPONSE (OUTPATIENT)
Dept: URBAN - METROPOLITAN AREA CLINIC 113 | Facility: CLINIC | Age: 83
End: 2023-08-23

## 2023-08-23 RX ORDER — PANTOPRAZOLE SODIUM 40 MG/1
TAKE 1 TABLET BY MOUTH EVERY DAY FOR 90 DAYS TABLET, DELAYED RELEASE ORAL
Qty: 90 TABLET | Refills: 3 | OUTPATIENT

## 2023-08-23 RX ORDER — PANTOPRAZOLE SODIUM 40 MG/1
1 TABLET TABLET, DELAYED RELEASE ORAL ONCE A DAY
Qty: 90 | Refills: 3 | OUTPATIENT

## 2023-11-08 ENCOUNTER — DASHBOARD ENCOUNTERS (OUTPATIENT)
Age: 83
End: 2023-11-08

## 2023-11-08 ENCOUNTER — OFFICE VISIT (OUTPATIENT)
Dept: URBAN - METROPOLITAN AREA CLINIC 113 | Facility: CLINIC | Age: 83
End: 2023-11-08
Payer: MEDICARE

## 2023-11-08 VITALS
WEIGHT: 114 LBS | BODY MASS INDEX: 21.52 KG/M2 | DIASTOLIC BLOOD PRESSURE: 84 MMHG | TEMPERATURE: 97.3 F | RESPIRATION RATE: 14 BRPM | HEIGHT: 61 IN | SYSTOLIC BLOOD PRESSURE: 179 MMHG | HEART RATE: 75 BPM

## 2023-11-08 DIAGNOSIS — R13.19 ESOPHAGEAL DYSPHAGIA: ICD-10-CM

## 2023-11-08 DIAGNOSIS — R63.4 WEIGHT LOSS: ICD-10-CM

## 2023-11-08 DIAGNOSIS — R13.12 OROPHARYNGEAL DYSPHAGIA: ICD-10-CM

## 2023-11-08 DIAGNOSIS — R10.13 EPIGASTRIC ABDOMINAL PAIN: ICD-10-CM

## 2023-11-08 DIAGNOSIS — R68.81 EARLY SATIETY: ICD-10-CM

## 2023-11-08 DIAGNOSIS — K59.09 CHRONIC CONSTIPATION: ICD-10-CM

## 2023-11-08 DIAGNOSIS — K21.9 GASTROESOPHAGEAL REFLUX DISEASE, UNSPECIFIED WHETHER ESOPHAGITIS PRESENT: ICD-10-CM

## 2023-11-08 DIAGNOSIS — R11.0 NAUSEA: ICD-10-CM

## 2023-11-08 PROCEDURE — 99214 OFFICE O/P EST MOD 30 MIN: CPT | Performed by: INTERNAL MEDICINE

## 2023-11-08 RX ORDER — DICYCLOMINE HYDROCHLORIDE 10 MG/1
1 TABLET CAPSULE ORAL
Status: ACTIVE | COMMUNITY

## 2023-11-08 RX ORDER — ASPIRIN 81 MG/1
1 TABLET TABLET, COATED ORAL ONCE A DAY
Status: ACTIVE | COMMUNITY

## 2023-11-08 RX ORDER — VIT A/VIT C/VIT E/ZINC/COPPER 2148-113
AS DIRECTED TABLET ORAL
Status: ACTIVE | COMMUNITY

## 2023-11-08 RX ORDER — GABAPENTIN 100 MG/1
1 CAPSULE CAPSULE ORAL ONCE A DAY
Status: ACTIVE | COMMUNITY

## 2023-11-08 RX ORDER — MONTELUKAST SODIUM 10 MG/1
TAKE 1 TABLET DAILY AS DIRECTED TABLET, FILM COATED ORAL
Refills: 0 | Status: ACTIVE | COMMUNITY

## 2023-11-08 RX ORDER — ATORVASTATIN CALCIUM 40 MG/1
1/2 TABLET TABLET, FILM COATED ORAL ONCE A DAY
Status: ACTIVE | COMMUNITY

## 2023-11-08 RX ORDER — ASCORBIC ACID 500 MG
TAKE 1 TABLET DAILY TABLET ORAL
Refills: 0 | Status: ACTIVE | COMMUNITY

## 2023-11-08 RX ORDER — CALCIUM CITRATE/VITAMIN D3 315MG-6.25
1 TABLET TABLET ORAL TWICE A DAY
Refills: 0 | Status: ACTIVE | COMMUNITY

## 2023-11-08 RX ORDER — TEMAZEPAM 30 MG/1
1 CAPSULE AT BEDTIME AS NEEDED CAPSULE ORAL ONCE A DAY
Status: ACTIVE | COMMUNITY

## 2023-11-08 RX ORDER — SUCRALFATE 1 G/1
1 TABLET; DISSOLVE IN 2 TSP WATER TABLET ORAL
Qty: 60 | Refills: 3 | Status: ACTIVE | COMMUNITY

## 2023-11-08 RX ORDER — CHOLECALCIFEROL (VITAMIN D3) 125 MCG
AS DIRECTED TABLET ORAL
Status: ACTIVE | COMMUNITY

## 2023-11-08 RX ORDER — UBIDECARENONE/VIT E ACET 100MG-5
1 CAPSULE WITH A MEAL CAPSULE ORAL ONCE A DAY
Refills: 0 | Status: ACTIVE | COMMUNITY

## 2023-11-08 RX ORDER — PANTOPRAZOLE SODIUM 40 MG/1
TAKE 1 TABLET BY MOUTH EVERY DAY FOR 90 DAYS TABLET, DELAYED RELEASE ORAL
Qty: 90 TABLET | Refills: 3 | Status: ACTIVE | COMMUNITY

## 2023-11-08 RX ORDER — PITAVASTATIN CALCIUM 2.09 MG/1
1 TABLET TABLET, FILM COATED ORAL ONCE A DAY
Refills: 0 | Status: ON HOLD | COMMUNITY
Start: 2019-08-28

## 2023-11-08 NOTE — HPI-TODAY'S VISIT:
This is an 83-year-old female with a history of arthritis, GERD, chronic constipation, excessive gas, epigastric pain, nausea, and oropharyngeal dysphagia presenting for follow-up. She was last seen on 7/20/23.  She was previously seen 9/28/2022.  Acid reflux was well controlled on pantoprazole 40 mg daily.  Episodes of epigastric pain were rare since she had started PPI.  She has occasional mild nausea managed with dietary modification and sucralfate.  Chronic constipation is controlled with MiraLAX 1 capful daily.  She describes oropharyngeal dysphagia occurring with increasing frequency.  A modified barium swallow was once again offered.  She declined in part to her schedule and her daughters help.  It was recommended that she observe careful eating practices and positioning her head in a neutral, slightly chin down position when swallowing.  She was to call if symptoms worsen to schedule a barium swallow.  She reported excessive gas in the form of belching and flatus.  It was discussed that lactose intolerance was likely contributing.  She was unwilling to give up dairy products and Lactaid was ineffective.  Information was provided that included lifestyle modification to reduce air swallowing and decrease gas producing food.  FD guard or a simethicone product were considerations.  She was taking pantoprazole 40 mg every morning and intermittently had excessive belching.  Symptoms of difficulty swallowing worsened after she underwent C-spine surgery in early December 2022.  She reports medication lodged the base of her throat or in her upper esophagus.  This occasionally occurs when she is eating.  She has to cut her food into very small pieces.  Drinking liquids as eventually helpful.  She states she lost some weight shortly after surgery because her throat was swollen and she was choking easily.  If she postpones eating, she had epigastric pain described as "grinding or gnawing" with associated nausea, typically  relieved by taking sucralfate.  She had occasional lower abdominal pain if she was constipated.   she infrequently takes dicyclomine but reports it is effective.  She is taking MiraLAX 1 capful daily and was having a bowel movement most days.  She describesdher stools as "small squirts" with a large bowel movement once or twice a week on average.  She continued to report that she does not have the urge to defecate.  She had persistent gas and bloating.  She tried Beano and it was ineffective. She denied red blood per rectum or melena.  She reported dramatically improved neck pain since C-spine surgery.  She was requiring gabapentin less frequently.  After her visit with Francisca Smith on March 29, 2023, the patient was placed on pantoprazole 40 mg daily for reflux complaints and scheduled for upper GI endoscopy.  This was delayed for some time given the need for clearance by her neurosurgeon.  The patient was continued on Benefiber and MiraLAX for constipation (Benefiber 2 tablespoons/day and MiraLAX 1 capful per day).  The patient's upper endoscopy was ultimately done on Linda 15, 2023.  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  The duodenum was normal.  The patient states that she had improvement in her swallowing after dilation but she is still having some difficulty with swallowing solids.  Her co-Q10 pill, for example, sticks consistently.  She has had some mild episodic crampy abdominal pain.,  Primary localized to the epigastrium.  She had some severe headaches, which have been attributed to musculoskeletal pain and/or stress.  After her last visit, she had a CT scan of the abdomen and pelvis done because of weight loss on August 2, 2023.  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  A 5.1 cm complex right renal cyst, but no other abnormalities.  There were no acute findings.The patient tells me that the renal cyst has been known about for over 40 years. She had labs done on July 20, 2023 showing a lipase of 47, complete metabolic panel which is normal, including a creatinine of 1.03, total protein of 5.8, albumin 3.6, and a TSH of 1.8.  Her white blood cell count was 8000, hemoglobin 13.6, hematocrit 39 and platelet count 196,000. The patient's weight has been stable since her last office visit.  Nausea is improved.  There is no early satiety.  Reflux symptoms are controlled on pantoprazole 40 mg daily.  She stopped taking pantoprazole after that visit, and is now taking it as needed.  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  She had this injected, but this has not been better.  She is following up with Dr. Wallace for this. She has no acute GI complaints today.

## 2024-11-17 ENCOUNTER — ERX REFILL RESPONSE (OUTPATIENT)
Dept: URBAN - METROPOLITAN AREA CLINIC 113 | Facility: CLINIC | Age: 84
End: 2024-11-17

## 2024-11-17 RX ORDER — PANTOPRAZOLE SODIUM 40 MG/1
TAKE 1 TABLET BY MOUTH EVERY DAY TABLET, DELAYED RELEASE ORAL
Qty: 90 TABLET | Refills: 4 | OUTPATIENT

## 2024-11-17 RX ORDER — PANTOPRAZOLE SODIUM 40 MG/1
TAKE 1 TABLET BY MOUTH EVERY DAY TABLET, DELAYED RELEASE ORAL
Qty: 90 TABLET | Refills: 0 | OUTPATIENT

## 2025-02-19 ENCOUNTER — OFFICE VISIT (OUTPATIENT)
Dept: URBAN - METROPOLITAN AREA CLINIC 113 | Facility: CLINIC | Age: 85
End: 2025-02-19
Payer: MEDICARE

## 2025-02-19 VITALS
TEMPERATURE: 97.7 F | SYSTOLIC BLOOD PRESSURE: 153 MMHG | BODY MASS INDEX: 22.69 KG/M2 | DIASTOLIC BLOOD PRESSURE: 67 MMHG | HEIGHT: 61 IN | RESPIRATION RATE: 16 BRPM | HEART RATE: 73 BPM | WEIGHT: 120.2 LBS

## 2025-02-19 DIAGNOSIS — K21.9 GASTROESOPHAGEAL REFLUX DISEASE, UNSPECIFIED WHETHER ESOPHAGITIS PRESENT: ICD-10-CM

## 2025-02-19 DIAGNOSIS — K59.09 CHRONIC CONSTIPATION: ICD-10-CM

## 2025-02-19 DIAGNOSIS — R68.81 EARLY SATIETY: ICD-10-CM

## 2025-02-19 DIAGNOSIS — R13.19 ESOPHAGEAL DYSPHAGIA: ICD-10-CM

## 2025-02-19 DIAGNOSIS — R11.0 NAUSEA: ICD-10-CM

## 2025-02-19 DIAGNOSIS — R63.4 WEIGHT LOSS: ICD-10-CM

## 2025-02-19 DIAGNOSIS — R13.12 OROPHARYNGEAL DYSPHAGIA: ICD-10-CM

## 2025-02-19 PROCEDURE — 99214 OFFICE O/P EST MOD 30 MIN: CPT | Performed by: INTERNAL MEDICINE

## 2025-02-19 PROCEDURE — 99213 OFFICE O/P EST LOW 20 MIN: CPT | Performed by: INTERNAL MEDICINE

## 2025-02-19 RX ORDER — UBIDECARENONE/VIT E ACET 100MG-5
1 CAPSULE WITH A MEAL CAPSULE ORAL ONCE A DAY
Refills: 0 | Status: ACTIVE | COMMUNITY

## 2025-02-19 RX ORDER — ATORVASTATIN CALCIUM 40 MG/1
1/2 TABLET TABLET, FILM COATED ORAL ONCE A DAY
Status: ACTIVE | COMMUNITY

## 2025-02-19 RX ORDER — GABAPENTIN 100 MG/1
1 CAPSULE CAPSULE ORAL ONCE A DAY
Status: ACTIVE | COMMUNITY

## 2025-02-19 RX ORDER — ASCORBIC ACID 500 MG
TAKE 1 TABLET DAILY TABLET ORAL
Refills: 0 | Status: ACTIVE | COMMUNITY

## 2025-02-19 RX ORDER — DICYCLOMINE HYDROCHLORIDE 10 MG/1
1 TABLET CAPSULE ORAL
Status: ACTIVE | COMMUNITY

## 2025-02-19 RX ORDER — PITAVASTATIN CALCIUM 2.09 MG/1
1 TABLET TABLET, FILM COATED ORAL ONCE A DAY
Refills: 0 | Status: ON HOLD | COMMUNITY
Start: 2019-08-28

## 2025-02-19 RX ORDER — ASPIRIN 81 MG/1
1 TABLET TABLET, COATED ORAL ONCE A DAY
Status: ACTIVE | COMMUNITY

## 2025-02-19 RX ORDER — VIT A/VIT C/VIT E/ZINC/COPPER 2148-113
AS DIRECTED TABLET ORAL
Status: ACTIVE | COMMUNITY

## 2025-02-19 RX ORDER — PANTOPRAZOLE SODIUM 40 MG/1
TAKE 1 TABLET BY MOUTH EVERY DAY TABLET, DELAYED RELEASE ORAL
Qty: 90 TABLET | Refills: 0 | Status: ACTIVE | COMMUNITY

## 2025-02-19 RX ORDER — SUCRALFATE 1 G/1
1 TABLET; DISSOLVE IN 2 TSP WATER TABLET ORAL
Qty: 60 | Refills: 3 | Status: ACTIVE | COMMUNITY

## 2025-02-19 RX ORDER — CALCIUM CITRATE/VITAMIN D3 315MG-6.25
1 TABLET TABLET ORAL TWICE A DAY
Refills: 0 | Status: ACTIVE | COMMUNITY

## 2025-02-19 RX ORDER — TEMAZEPAM 30 MG/1
1 CAPSULE AT BEDTIME AS NEEDED CAPSULE ORAL ONCE A DAY
Status: ACTIVE | COMMUNITY

## 2025-02-19 RX ORDER — MONTELUKAST SODIUM 10 MG/1
TAKE 1 TABLET DAILY AS DIRECTED TABLET, FILM COATED ORAL
Refills: 0 | Status: ACTIVE | COMMUNITY

## 2025-02-19 RX ORDER — CHOLECALCIFEROL (VITAMIN D3) 125 MCG
AS DIRECTED TABLET ORAL
Status: ACTIVE | COMMUNITY

## 2025-02-19 NOTE — HPI-TODAY'S VISIT:
This is an 84-year-old female with a history of arthritis, GERD, chronic constipation, excessive gas, epigastric pain, nausea, and oropharyngeal dysphagia presenting for follow-up. She was last seen on 11/8/23.  She was previously seen 9/28/2022.  Acid reflux was well controlled on pantoprazole 40 mg daily.  Episodes of epigastric pain were rare since she had started PPI.  She has occasional mild nausea managed with dietary modification and sucralfate.  Chronic constipation is controlled with MiraLAX 1 capful daily.  She describes oropharyngeal dysphagia occurring with increasing frequency.  A modified barium swallow was once again offered.  She declined in part to her schedule and her daughters help.  It was recommended that she observe careful eating practices and positioning her head in a neutral, slightly chin down position when swallowing.  She was to call if symptoms worsen to schedule a barium swallow.  She reported excessive gas in the form of belching and flatus.  It was discussed that lactose intolerance was likely contributing.  She was unwilling to give up dairy products and Lactaid was ineffective.  Information was provided that included lifestyle modification to reduce air swallowing and decrease gas producing food.  FD guard or a simethicone product were considerations.  She was taking pantoprazole 40 mg every morning and intermittently had excessive belching.  Symptoms of difficulty swallowing worsened after she underwent C-spine surgery in early December 2022.  She reports medication lodged the base of her throat or in her upper esophagus.  This occasionally occurs when she is eating.  She has to cut her food into very small pieces.  Drinking liquids as eventually helpful.  She states she lost some weight shortly after surgery because her throat was swollen and she was choking easily.  If she postpones eating, she had epigastric pain described as "grinding or gnawing" with associated nausea, typically  relieved by taking sucralfate.  She had occasional lower abdominal pain if she was constipated.   she infrequently takes dicyclomine but reports it is effective.  She is taking MiraLAX 1 capful daily and was having a bowel movement most days.  She describesdher stools as "small squirts" with a large bowel movement once or twice a week on average.  She continued to report that she does not have the urge to defecate.  She had persistent gas and bloating.  She tried Beano and it was ineffective. She denied red blood per rectum or melena.  She reported dramatically improved neck pain since C-spine surgery.  She was requiring gabapentin less frequently.  After her visit with Francisca Buck on March 29, 2023, the patient was placed on pantoprazole 40 mg daily for reflux complaints and scheduled for upper GI endoscopy.  This was delayed for some time given the need for clearance by her neurosurgeon.  The patient was continued on Benefiber and MiraLAX for constipation (Benefiber 2 tablespoons/day and MiraLAX 1 capful per day).  The patient's upper endoscopy was ultimately done on Linda 15, 2023.  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  The duodenum was normal.  The patient had improvement in her swallowing after dilation but was still having some difficulty with swallowing solids and pills.  She had some mild episodic crampy abdominal pain primarily localized to the epigastrium.   She had labs done on July 20, 2023 showing a lipase of 47, complete metabolic panel which is normal, including a creatinine of 1.03, total protein of 5.8, albumin 3.6, and a TSH of 1.8. Her white blood cell count was 8000, hemoglobin 13.6, hematocrit 39 and platelet count 196,000.  She had a CT scan of the abdomen and pelvis done because of weight loss on August 2, 2023.  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  A 5.1 cm complex right renal cyst, but no other abnormalities.  There were no acute findings.The renal cyst had been known about for over 40 years.  At her 11/8/23 OV, her weight was stable  and nausea was improved, with no early satiety.  Reflux symptoms were controlled on pantoprazole 40 mg, which she was taking as needed. 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 She was following up with Dr. Wallace for this.  She had no acute GI complaints at that time.  GERD symptoms are currently controlled on pantoprazole 40 mg daily. She has tried stopping it due to concerns over osteoporosis and her GERD symptoms were uncontrolled. Constipation is controlled with Benefiber and Miralax. She denies epigastric pain or weight loss. She has been having severe headaches which have been attributed to DJD of the neck and nerve root compression. Dr. Wallace is planning surgery for this on 3/10/25.